# Patient Record
(demographics unavailable — no encounter records)

---

## 2024-12-16 NOTE — HISTORY OF PRESENT ILLNESS
[de-identified] : 64 yo f here for wellness, hx of HTN, Hepatitis B - BP at home 125/71 - takes lorazepam for flying. Mammo 5/20/24 Cologuard negative 1/31/24 Gyn: last visit - 2 years ago in June - - due in Spring.   Has not had shingrix or flu vax yet  Following with ophtho - has elevated pressures, using drops now. Has gained weight this year.  Recent achilles tendinitis.  has seen podiarist - less active.   - used to do yoga.   Did not see Dr dasilva,  last thyroid US 3 years ago.    note: 5/29/24 - cough for 3 dyas, covid test at home neg - was dry, then a little wet.  no fever or chills.  no sob.   no isnus congestion.   able to sleep through the night, no relation to food. - no difference inside or outside.   took robitussin DM Also with left heel pain for a month - getting worse.  worse walking barefoot  Note: 12/23 Has a hx of Hep B -  never treated.   follows with Dr Dasilva for thyroid nodules- biopsied in the past.- last seen 2 years ago.   mammo- annually at [facility] colonoscopy- never walks a lot, eats well.  sleeping well.    saw gyn- had a pelvic cyst.- seeing Dr Mercedes.  Dexa with osteopenia - taking calcium 400mg and vit D 1000 iu daily.  BP at home  130/74 2 days ago.    Would like refill of lorazepam Walks daily  1/2 hour.  used to work with a  sleeping 7 hours - gets up once a night.   eating a heaty diet -  lower carbs.   occasional pain in left hand thumb Since menopause has been more anxious.    Vaccines: did not get flu shot yet.  Has not had next covid vaccine yet.

## 2024-12-16 NOTE — PLAN
[FreeTextEntry1] : Wellness complete labs today EKG NSR @ 77 bpm.  HTN- well controlled at home- no change in regimen Hep B- chronic and she is not high risk - rec abd US, check viral studies Ovarian cyst- send for pelvic US Anxiety refill benzo for use for flying Weight gain- will work on lifestyle - diet and exercise Refill meds today Flu vaccine today

## 2025-03-21 NOTE — PLAN
[FreeTextEntry1] : HTN - elevated - may be related to timolol drops  - will continue to monitor- if it stays elevated over the next week recommend changing to losartan BID or switching to a longer acting ARB like Olmesartan - EKG  - with questionable junctional rhytm-  encourage evaluation with her cardiologist Dr Edi Mcghee - copy of EKG faxed to his office Hep B - due for abd US - encourage getting this done Continue with daily walking / exercise

## 2025-07-14 NOTE — HISTORY OF PRESENT ILLNESS
[de-identified] : 63 yo f here for wellness, hx of HTN, Hepatitis B - had been on timolol drops for a month.  Last Sunday not feeling well, could feel her heartbeat. - Stopped timolol 2 days ago.- now starting to feel better.  no headache.  Now on rhopressa.   had gone up on the dose of timolol over the course of the month.  on the higher dose is when she felt worse.  no changes in diet.  has been walking regularly.   last night drank parsely water - yesterday felt heart racing.   - always takes 1/2 tab of bystolic.   BP 3/17, 147/76, 3/18 147/79, 3/20  156/69.     - used to see cardiologist - Dr Edi Mcghee.          note: 12/16/24 - BP at home 125/71 - takes lorazepam for flying. Mammo 5/20/24 Cologuard negative 1/31/24 Gyn: last visit - 2 years ago in June - - due in Spring.   Has not had shingrix or flu vax yet  Following with ophtho - has elevated pressures, using drops now. Has gained weight this year.  Recent achilles tendinitis.  has seen podiarist - less active.   - used to do yoga.   Did not see Dr dasilva,  last thyroid US 3 years ago.    note: 5/29/24 - cough for 3 dyas, covid test at home neg - was dry, then a little wet.  no fever or chills.  no sob.   no isnus congestion.   able to sleep through the night, no relation to food. - no difference inside or outside.   took robitussin DM Also with left heel pain for a month - getting worse.  worse walking barefoot  Note: 12/23 Has a hx of Hep B -  never treated.   follows with Dr Dasilva for thyroid nodules- biopsied in the past.- last seen 2 years ago.   mammo- annually at [facility] colonoscopy- never walks a lot, eats well.  sleeping well.    saw gyn- had a pelvic cyst.- seeing Dr Mercedes.  Dexa with osteopenia - taking calcium 400mg and vit D 1000 iu daily.  BP at home  130/74 2 days ago.    Would like refill of lorazepam Walks daily  1/2 hour.  used to work with a  sleeping 7 hours - gets up once a night.   eating a heaty diet -  lower carbs.   occasional pain in left hand thumb Since menopause has been more anxious.    Vaccines: did not get flu shot yet.  Has not had next covid vaccine yet.      
Left Eye